# Patient Record
Sex: FEMALE | Race: OTHER | NOT HISPANIC OR LATINO
[De-identification: names, ages, dates, MRNs, and addresses within clinical notes are randomized per-mention and may not be internally consistent; named-entity substitution may affect disease eponyms.]

---

## 2023-03-24 PROBLEM — Z00.00 ENCOUNTER FOR PREVENTIVE HEALTH EXAMINATION: Status: ACTIVE | Noted: 2023-03-24

## 2023-03-28 ENCOUNTER — APPOINTMENT (OUTPATIENT)
Dept: PULMONOLOGY | Facility: CLINIC | Age: 63
End: 2023-03-28
Payer: MEDICAID

## 2023-03-28 ENCOUNTER — APPOINTMENT (OUTPATIENT)
Dept: PULMONOLOGY | Facility: CLINIC | Age: 63
End: 2023-03-28
Payer: MEDICARE

## 2023-03-28 ENCOUNTER — OUTPATIENT (OUTPATIENT)
Dept: OUTPATIENT SERVICES | Facility: HOSPITAL | Age: 63
LOS: 1 days | End: 2023-03-28
Payer: MEDICARE

## 2023-03-28 VITALS
SYSTOLIC BLOOD PRESSURE: 103 MMHG | WEIGHT: 188 LBS | BODY MASS INDEX: 33.31 KG/M2 | RESPIRATION RATE: 12 BRPM | HEIGHT: 63 IN | TEMPERATURE: 97.7 F | DIASTOLIC BLOOD PRESSURE: 75 MMHG | OXYGEN SATURATION: 99 % | HEART RATE: 118 BPM

## 2023-03-28 DIAGNOSIS — R14.0 ABDOMINAL DISTENSION (GASEOUS): ICD-10-CM

## 2023-03-28 DIAGNOSIS — I50.9 HEART FAILURE, UNSPECIFIED: ICD-10-CM

## 2023-03-28 DIAGNOSIS — R06.02 SHORTNESS OF BREATH: ICD-10-CM

## 2023-03-28 DIAGNOSIS — Z85.3 PERSONAL HISTORY OF MALIGNANT NEOPLASM OF BREAST: ICD-10-CM

## 2023-03-28 DIAGNOSIS — I48.91 UNSPECIFIED ATRIAL FIBRILLATION: ICD-10-CM

## 2023-03-28 DIAGNOSIS — F17.200 NICOTINE DEPENDENCE, UNSPECIFIED, UNCOMPLICATED: ICD-10-CM

## 2023-03-28 PROCEDURE — 74019 RADEX ABDOMEN 2 VIEWS: CPT | Mod: 26

## 2023-03-28 PROCEDURE — ZZZZZ: CPT

## 2023-03-28 PROCEDURE — 71046 X-RAY EXAM CHEST 2 VIEWS: CPT

## 2023-03-28 PROCEDURE — 94010 BREATHING CAPACITY TEST: CPT

## 2023-03-28 PROCEDURE — 99204 OFFICE O/P NEW MOD 45 MIN: CPT | Mod: 25

## 2023-03-28 PROCEDURE — 71046 X-RAY EXAM CHEST 2 VIEWS: CPT | Mod: 26

## 2023-03-28 PROCEDURE — 94729 DIFFUSING CAPACITY: CPT

## 2023-03-28 PROCEDURE — 74019 RADEX ABDOMEN 2 VIEWS: CPT

## 2023-03-28 RX ORDER — AMLODIPINE BESYLATE 5 MG/1
TABLET ORAL
Refills: 0 | Status: ACTIVE | COMMUNITY

## 2023-03-28 RX ORDER — LOSARTAN POTASSIUM 50 MG/1
50 TABLET, FILM COATED ORAL
Refills: 0 | Status: ACTIVE | COMMUNITY

## 2023-03-28 RX ORDER — METOPROLOL TARTRATE 75 MG/1
TABLET, FILM COATED ORAL
Refills: 0 | Status: ACTIVE | COMMUNITY

## 2023-03-28 RX ORDER — TRAMADOL HYDROCHLORIDE 25 MG/1
TABLET, COATED ORAL
Refills: 0 | Status: ACTIVE | COMMUNITY

## 2023-03-28 RX ORDER — RIVAROXABAN 2.5 MG/1
TABLET, FILM COATED ORAL
Refills: 0 | Status: ACTIVE | COMMUNITY

## 2023-03-28 RX ORDER — FUROSEMIDE 80 MG/1
TABLET ORAL
Refills: 0 | Status: ACTIVE | COMMUNITY

## 2023-03-28 RX ORDER — SIMVASTATIN 40 MG/1
40 TABLET, FILM COATED ORAL
Refills: 0 | Status: ACTIVE | COMMUNITY

## 2023-03-28 RX ORDER — UMECLIDINIUM BROMIDE AND VILANTEROL TRIFENATATE 62.5; 25 UG/1; UG/1
62.5-25 POWDER RESPIRATORY (INHALATION)
Qty: 1 | Refills: 0 | Status: ACTIVE | COMMUNITY
Start: 2023-03-28 | End: 1900-01-01

## 2023-03-31 NOTE — PHYSICAL EXAM
[No Acute Distress] : no acute distress [Well Nourished] : well nourished [Normal Oropharynx] : normal oropharynx [III] : Mallampati Class: III [Normal Rate/Rhythm] : normal rate/rhythm [Normal S1, S2] : normal s1, s2 [No Resp Distress] : no resp distress [Clear to Auscultation Bilaterally] : clear to auscultation bilaterally [Not Tender] : not tender [Soft] : soft [Oriented x3] : oriented x3 [Normal Affect] : normal affect [TextBox_89] : abdominal distention.

## 2023-03-31 NOTE — HISTORY OF PRESENT ILLNESS
[Former] : former [TextBox_4] : 62 year old female, former smoker with h/o COPD with CHF (on lasix, patient has stopped lasix for last few days) came to clinic with SOB on minimal exertion. She is c/o pedal edema and abdomen distention. Patient is c/o worsening constipation and abdominal distention for last few days. Denies cough, chest pain, fever or sore throat.

## 2023-03-31 NOTE — DISCUSSION/SUMMARY
[FreeTextEntry1] : 62 year old female, former smoker with h/o COPD with CHF (on lasix, patient has stopped lasix for last few days) came to clinic with SOB on minimal exertion.\par \par Review:\par - PCP note\par - CXR: RML linear atelectasis\par - X ray abdomen: Constipated. \par - PFT (3 /23 ): FEV1 70 , FEV1/FVC 77  , TLC   , DLCO 70  \par \par A/P\par Bedside lung ultrasound ruled out pleural effusion 0r ascites.  No pulmonary edema on lung ultrasound. X ray abdomen showed persistent constipation. Most likely etiology for worsening SOB is abdominal distention due to constipation. Anoro 1 puff daily started for mild obstruction due to COPD. Follow up in 1 month\par

## 2023-03-31 NOTE — PROCEDURE
[Thoracic Ultrasound] : Thoracic Ultrasound [A line] : A line: Yes [B line] : B line: No [Pleural Effusion] : Pleural Effusion: No [Lung sliding] : Lung sliding: Yes [Consolidation] : Consolidation: No [Normal] : Normal [Ascites] : Ascites: No [FreeTextEntry2] : No pleural effusion/pulmonary edema [FreeTextEntry6] : No ascites.

## 2023-03-31 NOTE — REASON FOR VISIT
[Consultation] : a consultation [Shortness of Breath] : shortness of breath [TextBox_13] : Dr. Reed Tucker

## 2023-03-31 NOTE — REVIEW OF SYSTEMS
[Fever] : no fever [Chills] : no chills [Dry Eyes] : no dry eyes [Eye Irritation] : no eye irritation [Sputum] : no sputum [Cough] : no cough [Chest Discomfort] : no chest discomfort [Orthopnea] : no orthopnea [Nocturia] : no nocturia [Anemia] : no anemia [History of Iron Deficiency] : no history of iron deficiency [Headache] : no headache [Dizziness] : no dizziness

## 2023-04-03 DIAGNOSIS — R06.02 SHORTNESS OF BREATH: ICD-10-CM

## 2023-04-03 DIAGNOSIS — R19.00 INTRA-ABDOMINAL AND PELVIC SWELLING, MASS AND LUMP, UNSPECIFIED SITE: ICD-10-CM

## 2023-04-03 DIAGNOSIS — J98.11 ATELECTASIS: ICD-10-CM

## 2023-04-03 DIAGNOSIS — R19.5 OTHER FECAL ABNORMALITIES: ICD-10-CM

## 2023-04-03 DIAGNOSIS — R14.3 FLATULENCE: ICD-10-CM

## 2023-04-03 DIAGNOSIS — I51.7 CARDIOMEGALY: ICD-10-CM

## 2023-04-17 ENCOUNTER — APPOINTMENT (OUTPATIENT)
Dept: PULMONOLOGY | Facility: CLINIC | Age: 63
End: 2023-04-17
Payer: MEDICARE

## 2023-04-17 DIAGNOSIS — J44.9 CHRONIC OBSTRUCTIVE PULMONARY DISEASE, UNSPECIFIED: ICD-10-CM

## 2023-04-17 PROCEDURE — 99443: CPT

## 2023-04-17 NOTE — HISTORY OF PRESENT ILLNESS
[Former] : former [TextBox_4] : 62 year old female, former smoker with h/o COPD with CHF (on lasix, patient has stopped lasix for last few days) came to clinic with SOB on minimal exertion. She is c/o pedal edema and abdomen distention. Patient is c/o worsening constipation and abdominal distention for last few days. Denies cough, chest pain, fever or sore throat. \par \par 4/17/23:\par Persistent SOB. Anoro has not been started. Her leg swelling and abdominal swelling is better. She continues to have constipation but it is better as per patient.

## 2023-04-17 NOTE — DISCUSSION/SUMMARY
[FreeTextEntry1] : 62 year old female, former smoker with h/o COPD with CHF with abdominal swelling due to constipation.\par \par Review:\par - PCP note\par - CXR: RML linear atelectasis\par - X ray abdomen: Constipated. \par - PFT (3 /23 ): FEV1 70 , FEV1/FVC 77  , TLC   , DLCO 70  \par \par A/P\par - Patient will start Anoro today (counselling done) with prn albuterol\par - Bowel regimen for constipation. Explained to patient that abdominal distention can cause her to be more SOB. \par - Follow up after using Anoro for 1 month\par

## 2023-04-17 NOTE — CONSULT LETTER
[Dear  ___] : Dear  [unfilled], [Consult Letter:] : I had the pleasure of evaluating your patient, [unfilled]. [Please see my note below.] : Please see my note below. [Consult Closing:] : Thank you very much for allowing me to participate in the care of this patient.  If you have any questions, please do not hesitate to contact me. [FreeTextEntry3] : Sincerely\par \par Joce Dykes MD [facility]\par , [facility] [employer] [facility]\par Associate , Pulmonary and Critical Care Fellowship\par Pulmonary and Critical Care\par [facility]\par Phone: [phone]\par

## 2023-04-17 NOTE — REVIEW OF SYSTEMS
[Fever] : no fever [Chills] : no chills [Dry Eyes] : no dry eyes [Eye Irritation] : no eye irritation [Cough] : no cough [Sputum] : no sputum [Chest Discomfort] : no chest discomfort [Orthopnea] : no orthopnea [Nocturia] : no nocturia [Anemia] : no anemia [History of Iron Deficiency] : no history of iron deficiency [Headache] : no headache [Dizziness] : no dizziness